# Patient Record
Sex: MALE | Race: ASIAN | NOT HISPANIC OR LATINO | Employment: UNEMPLOYED | URBAN - METROPOLITAN AREA
[De-identification: names, ages, dates, MRNs, and addresses within clinical notes are randomized per-mention and may not be internally consistent; named-entity substitution may affect disease eponyms.]

---

## 2019-05-16 ENCOUNTER — OFFICE VISIT (OUTPATIENT)
Dept: URGENT CARE | Facility: MEDICAL CENTER | Age: 59
End: 2019-05-16
Payer: COMMERCIAL

## 2019-05-16 VITALS
SYSTOLIC BLOOD PRESSURE: 200 MMHG | HEART RATE: 72 BPM | DIASTOLIC BLOOD PRESSURE: 110 MMHG | HEIGHT: 70 IN | BODY MASS INDEX: 30.32 KG/M2 | WEIGHT: 211.8 LBS | TEMPERATURE: 98.5 F | OXYGEN SATURATION: 96 %

## 2019-05-16 DIAGNOSIS — Z76.0 MEDICATION REFILL: ICD-10-CM

## 2019-05-16 PROCEDURE — 99203 OFFICE O/P NEW LOW 30 MIN: CPT | Performed by: PHYSICIAN ASSISTANT

## 2019-05-16 RX ORDER — HYDROXYZINE 50 MG/1
50 TABLET, FILM COATED ORAL 3 TIMES DAILY PRN
COMMUNITY

## 2019-05-16 RX ORDER — HYDRALAZINE HYDROCHLORIDE 50 MG/1
50 TABLET, FILM COATED ORAL 2 TIMES DAILY
Qty: 6 TAB | Refills: 0 | Status: SHIPPED | OUTPATIENT
Start: 2019-05-16 | End: 2019-05-19

## 2019-05-16 ASSESSMENT — ENCOUNTER SYMPTOMS
SENSORY CHANGE: 0
DIZZINESS: 0
FOCAL WEAKNESS: 0
TINGLING: 0
SPEECH CHANGE: 0
HEADACHES: 0

## 2019-05-16 NOTE — PROGRESS NOTES
"  Subjective:   Hunter Mayers is a 89 y.o. male who presents today with   Chief Complaint   Patient presents with   • Medication Problem     visiting and forgot all his medications, needs BP pills, needs 2 days       HPI   Patient presents today for need of blood pressure medication as he is visiting in the area for a graduation he forgot his medication in [place].  His daughter is going to be bringing his medication to him tomorrow but needs some in the meantime.  He denies any symptoms at this time.  Patient states that he takes 50 mg hydralazine twice daily and his blood pressure is normally controlled in the 130s over 90s range.  PMH:  has no past medical history on file.  MEDS:   Current Outpatient Prescriptions:   •  hydrOXYzine HCl (ATARAX) 50 MG Tab, Take 50 mg by mouth 3 times a day as needed for Itching., Disp: , Rfl:   •  Insulin Lispro Prot & Lispro (HUMALOG MIX 75/25 SC), Inject  as instructed., Disp: , Rfl:   •  Omega-3-acid Ethyl Esters (LOVAZA PO), Take  by mouth., Disp: , Rfl:   •  Azilsartan Medoxomil (EDARBI PO), Take  by mouth., Disp: , Rfl:   •  AMLODIPINE-ATORVASTATIN PO, Take  by mouth., Disp: , Rfl:   •  PRAVASTATIN SODIUM PO, Take  by mouth., Disp: , Rfl:   •  ALLOPURINOL PO, Take  by mouth., Disp: , Rfl:   •  hydrALAZINE (APRESOLINE) 50 MG Tab, Take 1 Tab by mouth 2 Times a Day for 3 days., Disp: 6 Tab, Rfl: 0  ALLERGIES: Not on File  SURGHX: History reviewed. No pertinent surgical history.  SOCHX:  reports that he has never smoked. He has never used smokeless tobacco.  FH: Reviewed with patient, not pertinent to this visit.       Review of Systems   Neurological: Negative for dizziness, tingling, sensory change, speech change, focal weakness and headaches.   All other systems reviewed and are negative.       Objective:   BP (!) 200/110   Pulse 72   Temp 36.9 °C (98.5 °F)   Ht 1.778 m (5' 10\")   Wt 96.1 kg (211 lb 12.8 oz)   SpO2 96%   BMI 30.39 kg/m²   Physical Exam "   Constitutional: Vital signs are normal. He appears well-developed and well-nourished. No distress.   HENT:   Head: Normocephalic and atraumatic.   Right Ear: Hearing normal.   Left Ear: Hearing normal.   Eyes: Pupils are equal, round, and reactive to light.   Normal visual acuity with corrective lenses   Cardiovascular: Normal rate, regular rhythm and normal heart sounds.    Pulmonary/Chest: Effort normal.   Musculoskeletal:   Normal movement and strength bilaterally in all 4 extremities   Neurological: He is alert. No cranial nerve deficit or sensory deficit. Coordination normal. GCS eye subscore is 4. GCS verbal subscore is 5. GCS motor subscore is 6.   Skin: Skin is warm and dry.   Psychiatric: He has a normal mood and affect.   Nursing note and vitals reviewed.        Assessment/Plan:   Assessment    1. Medication refill  - hydrALAZINE (APRESOLINE) 50 MG Tab; Take 1 Tab by mouth 2 Times a Day for 3 days.  Dispense: 6 Tab; Refill: 0  - UC AMA/Refusal of Treatment    Other orders  - hydrOXYzine HCl (ATARAX) 50 MG Tab; Take 50 mg by mouth 3 times a day as needed for Itching.  - Insulin Lispro Prot & Lispro (HUMALOG MIX 75/25 SC); Inject  as instructed.  - Omega-3-acid Ethyl Esters (LOVAZA PO); Take  by mouth.  - Azilsartan Medoxomil (EDARBI PO); Take  by mouth.  - AMLODIPINE-ATORVASTATIN PO; Take  by mouth.  - PRAVASTATIN SODIUM PO; Take  by mouth.  - ALLOPURINOL PO; Take  by mouth.  Discussed with patient that he should go to the emergency room for higher level of evaluation given the extreme level of blood pressure today.  I discussed with him at length the possible side effects of blood pressure at that level including but not limited to organ damage or stroke.  He states that he is going to try to take blood pressure medication and if it does not bring his blood pressure down he will go to the emergency room.  I did have the patient sign AMA given that he is leaving against my recommendation that he go to the  emergency room at this time.  Differential diagnosis, natural history, supportive care, and indications for immediate follow-up discussed.   Patient given instructions and understanding of medications and treatment.    If not improving in 3-5 days, F/U with PCP or return to UC if symptoms worsen.  Strict ER precautions given.  Patient agreeable to plan.      Jl Hudson PA-C